# Patient Record
(demographics unavailable — no encounter records)

---

## 2024-10-07 NOTE — HISTORY OF PRESENT ILLNESS
[FreeTextEntry1] : 71 year old man seen 06/13/2022 for follow up. He was pt of Dr barajas, who obtained prostate MRI for elevated PSA. Results showed PIRADS 3 lesion, 54 cc prostate gland volume. He discussed with Dr Mora who recommended observtion with PATRICK and PSA q6 months. Pt reports mild LUTS with some frequency urgency and nocturia x1-2/night. Not bothersome. No other complaints.  12/12/2022: Patient presents for follow up. He reports episode of increased urgency, flank pain, but spontaneously stopped. Had similar episode in past and was kidney stone, about 10 years ago. No acute LUTS or flank pain now. repeat PSA is 7.2 PSAD 0.13  03/20/2023: Patient presents for follow up. He reports some nocturia 2-3x/night but minimal bother, no other complaints. PSA 6.03   PSAD 0.11  09/25/2023: Patient presents for follow up. He reports urgency has worsened, no UUI but he has pulled his car over and voided along the side of the highway. He reports some weak stream and dribbling at first am voids, but not throughout the day.  some nocturia 2-3x/night but minimal bother, no other complaints. PSA 6.18   PSAD 0.18 PVR 8 mL  01/05/2024: Patient presents for follow up. He reports oxybutynin is "life changing." He is using it PRN, and when takes it can go on 4 hour car rides without a problem. Mild dry mouth, no other AEs. No new LUTS. PVR 39 mL.  04/15/2024: Patient presents for follow up. He reports he used oxybutynin about 3 times for urgency. He found it effective. Some dry mouth but not bothersome. No other acute or bothersome LUTS.   10/07/2024: Patient presents for follow up. He reports good results with oxybutynin, both for daytime urgency and nocturia. Some dry mouth but not bothersome. PSA 7.56, 13% free, PSAD 0.14.

## 2024-10-07 NOTE — ASSESSMENT
[FreeTextEntry1] : 72 yo M with OAB. Continue oxybutynin.  For elevated PSA, will obtain MRI for biopsy planning.

## 2025-03-27 NOTE — REVIEW OF SYSTEMS
[Negative] : Psychiatric [FreeTextEntry8] : Overactive bladder controlled with oxybutynin [de-identified] : Numbness of feet

## 2025-03-27 NOTE — ASSESSMENT
[FreeTextEntry1] : Chronic bilateral foot numbness nerve conduction studies negative check heavy metals  Has had flu shot and COVID boosters  Check labs

## 2025-03-27 NOTE — HISTORY OF PRESENT ILLNESS
[FreeTextEntry1] : Medicare annual [de-identified] : Complaint of chronic numbness and tingling of both his feet going on for years.  Sometimes unsure of where his feet are decreased proprioception he has been evaluated by neurology pain management has multiple herniated disks lumbar spine however no back pain and numbness is not positional.  He has had nerve conduction studies which are normal.  He did work in electronics use disorder often has never been evaluated for heavy metal toxicity  Elevated PSA with enlarged prostate recent PI-RADS score 2  Colonoscopy last year unremarkable  He is very active denies cardiovascular symptoms with exertion

## 2025-03-27 NOTE — HEALTH RISK ASSESSMENT
[Very Good] : ~his/her~  mood as very good [No] : No [No falls in past year] : Patient reported no falls in the past year [PHQ-2 Negative - No further assessment needed] : PHQ-2 Negative - No further assessment needed [Never] : Never [Patient reported colonoscopy was normal] : Patient reported colonoscopy was normal [Change in mental status noted] : No change in mental status noted [None] : None [] :  [Fully functional (bathing, dressing, toileting, transferring, walking, feeding)] : Fully functional (bathing, dressing, toileting, transferring, walking, feeding) [Fully functional (using the telephone, shopping, preparing meals, housekeeping, doing laundry, using] : Fully functional and needs no help or supervision to perform IADLs (using the telephone, shopping, preparing meals, housekeeping, doing laundry, using transportation, managing medications and managing finances) [ColonoscopyDate] : 04/24

## 2025-04-21 NOTE — HISTORY OF PRESENT ILLNESS
[FreeTextEntry1] : 71 year old man seen 06/13/2022 for follow up. He was pt of Dr barajas, who obtained prostate MRI for elevated PSA. Results showed PIRADS 3 lesion, 54 cc prostate gland volume. He discussed with Dr Mora who recommended observtion with PATRICK and PSA q6 months. Pt reports mild LUTS with some frequency urgency and nocturia x1-2/night. Not bothersome. No other complaints.  12/12/2022: Patient presents for follow up. He reports episode of increased urgency, flank pain, but spontaneously stopped. Had similar episode in past and was kidney stone, about 10 years ago. No acute LUTS or flank pain now. repeat PSA is 7.2 PSAD 0.13  03/20/2023: Patient presents for follow up. He reports some nocturia 2-3x/night but minimal bother, no other complaints. PSA 6.03   PSAD 0.11  09/25/2023: Patient presents for follow up. He reports urgency has worsened, no UUI but he has pulled his car over and voided along the side of the highway. He reports some weak stream and dribbling at first am voids, but not throughout the day.  some nocturia 2-3x/night but minimal bother, no other complaints. PSA 6.18   PSAD 0.18 PVR 8 mL  01/05/2024: Patient presents for follow up. He reports oxybutynin is "life changing." He is using it PRN, and when takes it can go on 4 hour car rides without a problem. Mild dry mouth, no other AEs. No new LUTS. PVR 39 mL.  04/15/2024: Patient presents for follow up. He reports he used oxybutynin about 3 times for urgency. He found it effective. Some dry mouth but not bothersome. No other acute or bothersome LUTS.   10/07/2024: Patient presents for follow up. He reports good results with oxybutynin, both for daytime urgency and nocturia. Some dry mouth but not bothersome. PSA 7.56, 13% free, PSAD 0.14.   04/21/2025: Patient presents for follow up. He reports using oxybutynin PRN has been working well. Has only used about 30 pills in last 18 months. No new LUTS. Prostate MRI was PIRADS 2, prostate volume 56 cc, PSA 6.3, PSAD 0.11. Random bladder scan 87 mL.

## 2025-04-21 NOTE — ASSESSMENT
[FreeTextEntry1] : 74 yo M with OAB. Continue oxybutynin PRN..  For elevated PSA, MRI neg and PSAD wnl. Will repeat PSA in 12 months.

## 2025-06-02 NOTE — HISTORY OF PRESENT ILLNESS
[FreeTextEntry8] : left foot swelling was out mowing lawn 3 days ago got bite left posterior ankle then developed  blister left 1 st toe proximal to nail no temp

## 2025-06-02 NOTE — PHYSICAL EXAM
[Clear to Auscultation] : lungs were clear to auscultation bilaterally [Regular Rhythm] : with a regular rhythm [de-identified] : puncture wound with erythema left posterior ankle 1.5 cm diameter blister left 1st toe proximal to nail

## 2025-06-12 NOTE — REVIEW OF SYSTEMS
[Hot Flashes] : hot flashes [Earache] : earache [Frequency] : frequency [Negative] : Heme/Lymph [Fever] : no fever [Fatigue] : no fatigue [Night Sweats] : no night sweats [Hearing Loss] : no hearing loss [Nasal Discharge] : no nasal discharge [Sore Throat] : no sore throat [Dysuria] : no dysuria [Incontinence] : no incontinence [Hematuria] : no hematuria [FreeTextEntry2] : +decreased appetite

## 2025-06-12 NOTE — ASSESSMENT
[FreeTextEntry1] : Dizziness / Headaches - Dehydration vs. electrolyte imbalance  - f/u CBC, CMP - f/u in 1-2 weeks  L toe blister - healing - continue topical ointments

## 2025-06-12 NOTE — HISTORY OF PRESENT ILLNESS
[FreeTextEntry1] : FUV [de-identified] : 75 y/o M with BPH and peripheral neuropathy presenting to the office for headaches, ear pain b/l. A couple weeks ago patient got bit by an unspecified insect on L ankle, after which his foot swelled and he developed a blister on L big toe. Wife attempted to drain at home, however required drainage in the office twice afterwards. He completed 7 days of Augmentin with improvement in blister and swelling. Since then, he reports feeling "off" with several vague sxs such as fatigue, decreased appetite, intermittent headaches.  Today, patient was sitting on park bench drinking hot coffee and became lightheaded with headaches and b/l ear pain. Denied LOC, N/V, blurred vision. No recent trauma or fall. Has had darker urine than normal.

## 2025-06-12 NOTE — PHYSICAL EXAM
[Normal] : normal rate, regular rhythm, normal S1 and S2 and no murmur heard [de-identified] : +healing blister on dorsum of L big toe. No surrounding erythema or edema. Normal skin temperature.